# Patient Record
Sex: FEMALE | URBAN - METROPOLITAN AREA
[De-identification: names, ages, dates, MRNs, and addresses within clinical notes are randomized per-mention and may not be internally consistent; named-entity substitution may affect disease eponyms.]

---

## 2017-07-07 ENCOUNTER — IMPORTED ENCOUNTER (OUTPATIENT)
Dept: URBAN - METROPOLITAN AREA CLINIC 9 | Facility: CLINIC | Age: 67
End: 2017-07-07

## 2018-09-28 ENCOUNTER — IMPORTED ENCOUNTER (OUTPATIENT)
Dept: URBAN - METROPOLITAN AREA CLINIC 9 | Facility: CLINIC | Age: 68
End: 2018-09-28

## 2019-01-30 ENCOUNTER — IMPORTED ENCOUNTER (OUTPATIENT)
Dept: URBAN - METROPOLITAN AREA CLINIC 9 | Facility: CLINIC | Age: 69
End: 2019-01-30

## 2019-02-01 ENCOUNTER — IMPORTED ENCOUNTER (OUTPATIENT)
Dept: URBAN - METROPOLITAN AREA CLINIC 9 | Facility: CLINIC | Age: 69
End: 2019-02-01

## 2019-02-04 ENCOUNTER — IMPORTED ENCOUNTER (OUTPATIENT)
Dept: URBAN - METROPOLITAN AREA CLINIC 9 | Facility: CLINIC | Age: 69
End: 2019-02-04

## 2019-02-12 ENCOUNTER — IMPORTED ENCOUNTER (OUTPATIENT)
Dept: URBAN - METROPOLITAN AREA CLINIC 9 | Facility: CLINIC | Age: 69
End: 2019-02-12

## 2019-04-03 ENCOUNTER — IMPORTED ENCOUNTER (OUTPATIENT)
Dept: URBAN - METROPOLITAN AREA CLINIC 9 | Facility: CLINIC | Age: 69
End: 2019-04-03

## 2019-08-05 ENCOUNTER — IMPORTED ENCOUNTER (OUTPATIENT)
Dept: URBAN - METROPOLITAN AREA CLINIC 9 | Facility: CLINIC | Age: 69
End: 2019-08-05

## 2019-11-22 ENCOUNTER — IMPORTED ENCOUNTER (OUTPATIENT)
Dept: URBAN - METROPOLITAN AREA CLINIC 9 | Facility: CLINIC | Age: 69
End: 2019-11-22

## 2020-01-20 ENCOUNTER — IMPORTED ENCOUNTER (OUTPATIENT)
Dept: URBAN - METROPOLITAN AREA CLINIC 9 | Facility: CLINIC | Age: 70
End: 2020-01-20

## 2020-02-17 ENCOUNTER — IMPORTED ENCOUNTER (OUTPATIENT)
Dept: URBAN - METROPOLITAN AREA CLINIC 9 | Facility: CLINIC | Age: 70
End: 2020-02-17

## 2020-10-22 NOTE — PATIENT DISCUSSION
Recommend Bilateral lower lid blepharoplasty trans conj laser (discussed risks and benefits of sx. ..). discussed pt might pigment a little more due to olive skin tone.

## 2020-10-22 NOTE — PATIENT DISCUSSION
Recommend Mini lower lift, + platysmaplasty, post-tragel, SMAS to NLF, lid cheek junction, chin (discussed risks and benefits of sx. .. ).

## 2020-10-29 NOTE — PATIENT DISCUSSION
Patient informed of available non-opioid medications and other non-pharmacological options. Discussed advantages and disadvantages of the alternatives, including whether the patient is at-risk for, or has a history of, controlled substance abuse or misuse, and the patient’s personal preferences. [facility] “Opioid Alternative Pamphlet” was provided to patient.

## 2020-11-09 NOTE — PATIENT DISCUSSION
Patient informed of available non-opioid medications and other non-pharmacological options. Discussed advantages and disadvantages of the alternatives, including whether the patient is at-risk for, or has a history of, controlled substance abuse or misuse, and the patient’s personal preferences. [facility] “Opioid Alternative Pamphlet” was provided to patient.

## 2020-11-09 NOTE — PATIENT DISCUSSION
Patient informed of available non-opioid medications and other non-pharmacological options. Discussed advantages and disadvantages of the alternatives, including whether the patient is at-risk for, or has a history of, controlled substance abuse or misuse, and the patient’s personal preferences. [facility] “Opioid Alternative Pamphlet” was provided to patient.

## 2020-11-17 NOTE — PATIENT DISCUSSION
Recommend 2cc's of Juvederm Voluma or juvederm ultra(discussed risks and benefits. ..) Pt elects ultra with cannula to chin and ML on 11/17/20. Lot# [id number], exp. 07/15/21.

## 2020-11-17 NOTE — PATIENT DISCUSSION
Recommend 50 units of Xeomin. Patient elects Xeomin injection on 11/17/20. 50units discarded, 50 units used. (discussed risks and benefits Xeomin. ..).  lot # [id number], exp. 10/22.

## 2020-12-03 NOTE — PATIENT DISCUSSION
12/3/69-ozngwexb-Zxhgydndi 10 units of Xeomin. Patient elects Xeomin injection on 11/17/20. 90units discarded, 10 units used. (discussed risks and benefits Xeomin. ..).  lot # [id number], exp. 09/22.- Touch up N/C.  Next injection recommend 60 units. - 11/17/20 s/p Xeomin 50 units.

## 2020-12-03 NOTE — PATIENT DISCUSSION
11/17/20 injected-Recommend 2cc's of Juvederm Voluma or juvederm ultra(discussed risks and benefits. ..) Pt elects ultra with cannula to chin and ML on 11/17/20. Lot# [id number], exp. 07/15/21.

## 2021-01-29 ENCOUNTER — IMPORTED ENCOUNTER (OUTPATIENT)
Dept: URBAN - METROPOLITAN AREA CLINIC 9 | Facility: CLINIC | Age: 71
End: 2021-01-29

## 2021-01-29 PROBLEM — E11.3293: Noted: 2021-01-29

## 2021-01-29 PROBLEM — H35.3132: Noted: 2021-01-29

## 2021-01-29 PROBLEM — H43.813: Noted: 2021-01-29

## 2021-02-25 NOTE — PATIENT DISCUSSION
Recommend N/C touch up LLL central fat removal with one pass of laser and cauterize milia bump medially under local.

## 2021-02-25 NOTE — PATIENT DISCUSSION
12/3/18-dzitsgfy-Ghzrpcwlq 10 units of Xeomin. Patient elects Xeomin injection on 11/17/20. 90units discarded, 10 units used. (discussed risks and benefits Xeomin. ..).  lot # [id number], exp. 09/22.- Touch up N/C.  Next injection recommend 60 units. - 11/17/20 s/p Xeomin 50 units.

## 2021-02-25 NOTE — PATIENT DISCUSSION
11/17/20 injected-Recommend 2cc's of Juvederm Voluma or juvederm ultra(discussed risks and benefits. ..) Pt elects ultra with cannula to chin and ML on 11/17/20. Lot# [id number], exp. 07/15/21.

## 2021-03-02 NOTE — PATIENT DISCUSSION
11/17/20 injected-Recommend 2cc's of Juvederm Voluma or juvederm ultra(discussed risks and benefits. ..) Pt elects ultra with cannula to chin and ML on 11/17/20. Lot# [id number], exp. 07/15/21.

## 2021-03-02 NOTE — PATIENT DISCUSSION
12/3/36-blkdvtnp-Xkjkbkvua 10 units of Xeomin. Patient elects Xeomin injection on 11/17/20. 90units discarded, 10 units used. (discussed risks and benefits Xeomin. ..).  lot # [id number], exp. 09/22.- Touch up N/C.  Next injection recommend 60 units. - 11/17/20 s/p Xeomin 50 units.

## 2021-03-08 NOTE — PATIENT DISCUSSION
12/3/48-ixupttxz-Tckruucuz 10 units of Xeomin. Patient elects Xeomin injection on 11/17/20. 90units discarded, 10 units used. (discussed risks and benefits Xeomin. ..).  lot # [id number], exp. 09/22.- Touch up N/C.  Next injection recommend 60 units. - 11/17/20 s/p Xeomin 50 units.

## 2021-03-08 NOTE — PATIENT DISCUSSION
11/17/20 injected-Recommend 2cc's of Juvederm Voluma or juvederm ultra(discussed risks and benefits. ..) Pt elects ultra with cannula to chin and ML on 11/17/20. Lot# [id number], exp. 07/15/21.

## 2021-03-08 NOTE — PATIENT DISCUSSION
12/3/75-brvgjcmp-Jtvyvsmsq 10 units of Xeomin. Patient elects Xeomin injection on 11/17/20. 90units discarded, 10 units used. (discussed risks and benefits Xeomin. ..).  lot # [id number], exp. 09/22.- Touch up N/C.  Next injection recommend 60 units. - 11/17/20 s/p Xeomin 50 units.

## 2021-03-08 NOTE — PROCEDURE NOTE: SURGICAL
"<strong></strong><p><strong>PREOPERATIVE DIAGNOSIS</strong>: <span>&nbsp;&nbsp;&nbsp;&nbsp;&nbsp;&nbsp;&nbsp;&nbsp;&nbsp;&nbsp;&nbsp;&nbsp;&nbsp; </span>1. Slight residual central fat herniation, left lower lid. </p><p><span>&nbsp;&nbsp;&nbsp;&nbsp;&nbsp;&nbsp;&nbsp;&nbsp;&nbsp;&nbsp;&nbsp;&nbsp;&nbsp;&nbsp;&nbsp;&nbsp;&nbsp;&nbsp;&nbsp;&nbsp;&nbsp;&nbsp;&nbsp;&nbsp;&nbsp;&nbsp;&nbsp;&nbsp;&nbsp;&nbsp;&nbsp;&nbsp;&nbsp;&nbsp;&nbsp;&nbsp;&nbsp;&nbsp;&nbsp;&nbsp;&nbsp;&nbsp;&nbsp;&nbsp;&nbsp;&nbsp;&nbsp;&nbsp;&nbsp;&nbsp;&nbsp;&nbsp;&nbsp;&nbsp;&nbsp;&nbsp;&nbsp;&nbsp;&nbsp;&nbsp;&nbsp;&nbsp;&nbsp;&nbsp;&nbsp;&nbsp;&nbsp;&nbsp;&nbsp;&nbsp;&nbsp; </span>2. Slight residual skin wrinkling, left lower lid. </p><p><span>&nbsp;&nbsp;&nbsp;&nbsp;&nbsp;&nbsp;&nbsp;&nbsp;&nbsp;&nbsp;&nbsp;&nbsp;&nbsp;&nbsp;&nbsp;&nbsp;&nbsp;&nbsp;&nbsp;&nbsp;&nbsp;&nbsp;&nbsp;&nbsp;&nbsp;&nbsp;&nbsp;&nbsp;&nbsp;&nbsp;&nbsp;&nbsp;&nbsp;&nbsp;&nbsp;&nbsp;&nbsp;&nbsp;&nbsp;&nbsp;&nbsp;&nbsp;&nbsp;&nbsp;&nbsp;&nbsp;&nbsp;&nbsp;&nbsp;&nbsp;&nbsp;&nbsp;&nbsp;&nbsp;&nbsp;&nbsp;&nbsp;&nbsp;&nbsp;&nbsp;&nbsp;&nbsp;&nbsp;&nbsp;&nbsp;&nbsp;&nbsp;&nbsp;&nbsp;&nbsp;&nbsp; </span></p><p style=""margin-right:-.9pt;""><strong>POSTOPERATIVE DIAGNOSIS</strong>: <span>&nbsp;&nbsp;&nbsp;&nbsp;&nbsp;&nbsp;&nbsp;&nbsp;&nbsp;&nbsp;&nbsp; </span><span>&nbsp;</span><span>&nbsp;&nbsp;&nbsp;&nbsp;&nbsp;&nbsp;&nbsp;&nbsp;&nbsp;&nbsp; </span>Same.</p><p>&nbsp;</p><p style=""margin-right:-.9pt;""><strong>PROCEDURE</strong>: <span>&nbsp;&nbsp;&nbsp;&nbsp;&nbsp;&nbsp;&nbsp;&nbsp;&nbsp;&nbsp;&nbsp;&nbsp;&nbsp;&nbsp;&nbsp;&nbsp;&nbsp;&nbsp;&nbsp;&nbsp;&nbsp;&nbsp;&nbsp;&nbsp;&nbsp;&nbsp;&nbsp;&nbsp;&nbsp;&nbsp;&nbsp;&nbsp;&nbsp;&nbsp;&nbsp;&nbsp;&nbsp;&nbsp;&nbsp;&nbsp;&nbsp;&nbsp;&nbsp; </span>1. Laser re-surfacing of left lower lid. </p><p style=""margin-right:-.9pt;""><span>&nbsp;&nbsp;&nbsp;&nbsp;&nbsp;&nbsp;&nbsp;&nbsp;&nbsp;&nbsp;&nbsp;&nbsp;&nbsp;&nbsp;&nbsp;&nbsp;&nbsp;&nbsp;&nbsp;&nbsp;&nbsp;&nbsp;&nbsp;&nbsp;&nbsp;&nbsp;&nbsp;&nbsp;&nbsp;&nbsp;&nbsp;&nbsp;&nbsp;&nbsp;&nbsp;&nbsp;&nbsp;&nbsp;&nbsp;&nbsp;&nbsp;&nbsp;&nbsp;&nbsp;&nbsp;&nbsp;&nbsp;&nbsp;&nbsp;&nbsp;&nbsp;&nbsp;&nbsp;&nbsp;&nbsp;&nbsp;&nbsp;&nbsp;&nbsp;&nbsp;&nbsp;&nbsp;&nbsp;&nbsp;&nbsp;&nbsp;&nbsp;&nbsp;&nbsp;&nbsp;&nbsp; </span>2.  Central fat removal

## 2021-03-08 NOTE — PATIENT DISCUSSION
11/17/20 injected-Recommend 2cc's of Juvederm Voluma or juvederm ultra(discussed risks and benefits. ..) Pt elects ultra with cannula to chin and ML on 11/17/20. Lot# [id number], exp. 07/15/21.

## 2021-03-18 NOTE — PATIENT DISCUSSION
12/3/10-onhsolcw-Ojhpzxzzb 10 units of Xeomin. Patient elects Xeomin injection on 11/17/20. 90units discarded, 10 units used. (discussed risks and benefits Xeomin. ..).  lot # [id number], exp. 09/22.- Touch up N/C.  Next injection recommend 60 units. - 11/17/20 s/p Xeomin 50 units.

## 2021-03-18 NOTE — PATIENT DISCUSSION
11/17/20 injected-Recommend 2cc's of Juvederm Voluma or juvederm ultra(discussed risks and benefits. ..) Pt elects ultra with cannula to chin and ML on 11/17/20. Lot# [id number], exp. 07/15/21.

## 2021-10-07 NOTE — PATIENT DISCUSSION
11/17/20 injected-Recommend 2cc's of Juvederm Voluma or juvederm ultra(discussed risks and benefits. ..) Pt elects ultra with cannula to chin and ML on 11/17/20. Lot# [id number], exp. 07/15/21.

## 2021-10-07 NOTE — PATIENT DISCUSSION
No further surgery or filler recommended, may due more harm than good.  Healing well, lids in good position.

## 2021-10-18 ASSESSMENT — VISUAL ACUITY
OD_CC: 20/20 SN
OS_SC: 20/50 -2 SN
OS_CC: 20/20 -2 SN
OD_CC: 20/20 -3 SN
OD_SC: 20/20 - SN
OD_CC: 20/20 - SN
OD_SC: 20/20 -2 SN
OS_SC: 20/30 SN
OS_CC: 20/25 -2 SN
OS_SC: 20/30 + SN
OS_SC: 20/25 -2 SN
OS_SC: 20/40 + SN
OS_SC: 20/30 - SN
OS_SC: 20/30 - SN
OD_SC: 20/25 -2 SN
OD_SC: 20/25 SN
OS_SC: 20/40 SN
OS_PH: 20/25 -2 SN
OS_SC: 20/30 + SN
OD_SC: 20/25 SN
OD_CC: 20/25 - SN
OD_SC: 20/20 - SN
OS_CC: 20/25 - SN
OD_SC: 20/30 + SN
OD_SC: 20/25 - SN
OS_CC: 20/20 SN
OD_SC: 20/25 + SN

## 2021-10-18 ASSESSMENT — TONOMETRY
OS_IOP_MMHG: 14
OD_IOP_MMHG: 13
OD_IOP_MMHG: 19
OS_IOP_MMHG: 13
OD_IOP_MMHG: 13
OS_IOP_MMHG: 14
OS_IOP_MMHG: 10
OD_IOP_MMHG: 12
OD_IOP_MMHG: 15
OS_IOP_MMHG: 12
OS_IOP_MMHG: 13
OS_IOP_MMHG: 14
OD_IOP_MMHG: 14
OS_IOP_MMHG: 14
OS_IOP_MMHG: 16
OD_IOP_MMHG: 14
OS_IOP_MMHG: 14
OS_IOP_MMHG: 12
OS_IOP_MMHG: 15
OD_IOP_MMHG: 13
OD_IOP_MMHG: 12

## 2022-06-09 ENCOUNTER — ESTABLISHED PATIENT (OUTPATIENT)
Dept: URBAN - METROPOLITAN AREA CLINIC 4 | Facility: CLINIC | Age: 72
End: 2022-06-09

## 2022-06-09 DIAGNOSIS — H35.3132: ICD-10-CM

## 2022-06-09 DIAGNOSIS — E11.3293: ICD-10-CM

## 2022-06-09 DIAGNOSIS — H52.223: ICD-10-CM

## 2022-06-09 PROCEDURE — 92134 CPTRZ OPH DX IMG PST SGM RTA: CPT

## 2022-06-09 PROCEDURE — 92012 INTRM OPH EXAM EST PATIENT: CPT

## 2022-06-09 PROCEDURE — 92015 DETERMINE REFRACTIVE STATE: CPT

## 2022-06-09 ASSESSMENT — TONOMETRY
OS_IOP_MMHG: 14
OD_IOP_MMHG: 12

## 2022-06-09 ASSESSMENT — KERATOMETRY
OS_K2POWER_DIOPTERS: 43.50
OD_K1POWER_DIOPTERS: 42.75
OD_AXISANGLE_DEGREES: 159
OD_AXISANGLE2_DEGREES: 69
OS_K1POWER_DIOPTERS: 41.50
OD_K2POWER_DIOPTERS: 45.25
OS_AXISANGLE2_DEGREES: 92
OS_AXISANGLE_DEGREES: 002

## 2022-06-09 ASSESSMENT — VISUAL ACUITY
OU_SC: 20/40+1
OS_SC: 20/50+1
OD_SC: 20/40+1

## 2022-10-28 ENCOUNTER — ESTABLISHED PATIENT (OUTPATIENT)
Dept: URBAN - METROPOLITAN AREA CLINIC 17 | Facility: CLINIC | Age: 72
End: 2022-10-28

## 2022-10-28 DIAGNOSIS — E11.3293: ICD-10-CM

## 2022-10-28 DIAGNOSIS — H43.813: ICD-10-CM

## 2022-10-28 DIAGNOSIS — H35.3132: ICD-10-CM

## 2022-10-28 PROCEDURE — 92014 COMPRE OPH EXAM EST PT 1/>: CPT

## 2022-10-28 PROCEDURE — 92134 CPTRZ OPH DX IMG PST SGM RTA: CPT

## 2022-10-28 ASSESSMENT — VISUAL ACUITY
OS_SC: 20/30-2
OD_SC: 20/25+1

## 2022-10-28 ASSESSMENT — TONOMETRY
OD_IOP_MMHG: 17
OS_IOP_MMHG: 15

## 2023-05-12 ENCOUNTER — ESTABLISHED PATIENT (OUTPATIENT)
Dept: URBAN - METROPOLITAN AREA CLINIC 17 | Facility: CLINIC | Age: 73
End: 2023-05-12

## 2023-05-12 DIAGNOSIS — H43.813: ICD-10-CM

## 2023-05-12 DIAGNOSIS — E11.3293: ICD-10-CM

## 2023-05-12 DIAGNOSIS — H35.3132: ICD-10-CM

## 2023-05-12 PROCEDURE — 92134 CPTRZ OPH DX IMG PST SGM RTA: CPT

## 2023-05-12 PROCEDURE — 92014 COMPRE OPH EXAM EST PT 1/>: CPT

## 2023-05-12 ASSESSMENT — TONOMETRY
OD_IOP_MMHG: 12
OS_IOP_MMHG: 13

## 2023-05-12 ASSESSMENT — VISUAL ACUITY
OS_SC: 20/30
OD_SC: 20/30-

## 2023-06-30 ENCOUNTER — ESTABLISHED PATIENT (OUTPATIENT)
Dept: URBAN - METROPOLITAN AREA CLINIC 4 | Facility: CLINIC | Age: 73
End: 2023-06-30

## 2023-06-30 DIAGNOSIS — H04.203: ICD-10-CM

## 2023-06-30 PROCEDURE — 99213 OFFICE O/P EST LOW 20 MIN: CPT

## 2023-06-30 ASSESSMENT — VISUAL ACUITY
OD_CC: 20/30-1
OS_CC: 20/40

## 2023-07-20 ENCOUNTER — ESTABLISHED PATIENT (OUTPATIENT)
Dept: URBAN - METROPOLITAN AREA CLINIC 17 | Facility: CLINIC | Age: 73
End: 2023-07-20

## 2023-07-20 DIAGNOSIS — H04.123: ICD-10-CM

## 2023-07-20 PROCEDURE — 92012 INTRM OPH EXAM EST PATIENT: CPT

## 2023-07-20 PROCEDURE — 68761 CLOSE TEAR DUCT OPENING: CPT

## 2023-07-20 ASSESSMENT — VISUAL ACUITY
OU_SC: 20/25
OD_SC: 20/25-1
OS_SC: 20/30

## 2023-08-22 ENCOUNTER — FOLLOW UP (OUTPATIENT)
Dept: URBAN - METROPOLITAN AREA CLINIC 4 | Facility: CLINIC | Age: 73
End: 2023-08-22

## 2023-08-22 DIAGNOSIS — H04.123: ICD-10-CM

## 2023-08-22 PROCEDURE — 99213 OFFICE O/P EST LOW 20 MIN: CPT

## 2023-08-22 ASSESSMENT — VISUAL ACUITY
OD_SC: 20/30-2
OU_SC: 20/30-2
OS_SC: 20/30-2

## 2024-01-19 ENCOUNTER — ESTABLISHED PATIENT (OUTPATIENT)
Dept: URBAN - METROPOLITAN AREA CLINIC 17 | Facility: CLINIC | Age: 74
End: 2024-01-19

## 2024-01-19 DIAGNOSIS — H35.3132: ICD-10-CM

## 2024-01-19 DIAGNOSIS — E11.3293: ICD-10-CM

## 2024-01-19 DIAGNOSIS — H43.813: ICD-10-CM

## 2024-01-19 PROCEDURE — 99214 OFFICE O/P EST MOD 30 MIN: CPT

## 2024-01-19 PROCEDURE — 92134 CPTRZ OPH DX IMG PST SGM RTA: CPT

## 2024-01-19 ASSESSMENT — TONOMETRY
OD_IOP_MMHG: 16
OS_IOP_MMHG: 16

## 2024-01-19 ASSESSMENT — VISUAL ACUITY
OS_SC: 20/25+2
OD_SC: 20/20-1

## 2024-04-03 ENCOUNTER — ESTABLISHED PATIENT (OUTPATIENT)
Facility: LOCATION | Age: 74
End: 2024-04-03

## 2024-04-03 DIAGNOSIS — H52.223: ICD-10-CM

## 2024-04-03 DIAGNOSIS — H04.123: ICD-10-CM

## 2024-04-03 DIAGNOSIS — E11.3293: ICD-10-CM

## 2024-04-03 DIAGNOSIS — H43.813: ICD-10-CM

## 2024-04-03 DIAGNOSIS — H35.3132: ICD-10-CM

## 2024-04-03 PROCEDURE — 92134 CPTRZ OPH DX IMG PST SGM RTA: CPT

## 2024-04-03 PROCEDURE — 92014 COMPRE OPH EXAM EST PT 1/>: CPT

## 2024-04-03 PROCEDURE — 92015 DETERMINE REFRACTIVE STATE: CPT

## 2024-04-03 ASSESSMENT — TONOMETRY
OD_IOP_MMHG: 12
OS_IOP_MMHG: 12

## 2024-04-03 ASSESSMENT — KERATOMETRY
OS_AXISANGLE_DEGREES: 116
OS_AXISANGLE2_DEGREES: 26
OD_AXISANGLE2_DEGREES: 3
OD_K1POWER_DIOPTERS: 41.50
OS_K1POWER_DIOPTERS: 40.50
OD_K2POWER_DIOPTERS: 42.50
OD_AXISANGLE_DEGREES: 93
OS_K2POWER_DIOPTERS: 41.75

## 2024-04-03 ASSESSMENT — VISUAL ACUITY
OU_SC: 20/30+2
OS_SC: 20/30-1
OS_GLARE: 20/50+2
OD_GLARE: 20/40-1
OD_SC: 20/30+1

## 2024-08-23 ENCOUNTER — COMPREHENSIVE EXAM (OUTPATIENT)
Dept: URBAN - METROPOLITAN AREA CLINIC 17 | Facility: CLINIC | Age: 74
End: 2024-08-23

## 2024-08-23 DIAGNOSIS — H35.3132: ICD-10-CM

## 2024-08-23 DIAGNOSIS — E11.3293: ICD-10-CM

## 2024-08-23 DIAGNOSIS — H43.813: ICD-10-CM

## 2024-08-23 PROCEDURE — 99214 OFFICE O/P EST MOD 30 MIN: CPT

## 2024-08-23 PROCEDURE — 92134 CPTRZ OPH DX IMG PST SGM RTA: CPT

## 2024-08-23 ASSESSMENT — VISUAL ACUITY
OU_CC: 20/25-1
OS_CC: 20/25-2
OD_CC: 20/20

## 2024-08-23 ASSESSMENT — TONOMETRY
OS_IOP_MMHG: 13
OD_IOP_MMHG: 13

## 2025-02-28 ENCOUNTER — COMPREHENSIVE EXAM (OUTPATIENT)
Age: 75
End: 2025-02-28

## 2025-02-28 DIAGNOSIS — E11.3293: ICD-10-CM

## 2025-02-28 DIAGNOSIS — H35.3132: ICD-10-CM

## 2025-02-28 DIAGNOSIS — H43.813: ICD-10-CM

## 2025-02-28 PROCEDURE — 92134 CPTRZ OPH DX IMG PST SGM RTA: CPT

## 2025-02-28 PROCEDURE — 99214 OFFICE O/P EST MOD 30 MIN: CPT

## 2025-08-29 ENCOUNTER — COMPREHENSIVE EXAM (OUTPATIENT)
Age: 75
End: 2025-08-29

## 2025-08-29 DIAGNOSIS — H43.813: ICD-10-CM

## 2025-08-29 DIAGNOSIS — E11.3293: ICD-10-CM

## 2025-08-29 DIAGNOSIS — H35.3132: ICD-10-CM

## 2025-08-29 PROCEDURE — 92134 CPTRZ OPH DX IMG PST SGM RTA: CPT

## 2025-08-29 PROCEDURE — 99214 OFFICE O/P EST MOD 30 MIN: CPT

## 2025-08-29 PROCEDURE — 92201 OPSCPY EXTND RTA DRAW UNI/BI: CPT
